# Patient Record
Sex: MALE | Race: WHITE | Employment: OTHER | URBAN - METROPOLITAN AREA
[De-identification: names, ages, dates, MRNs, and addresses within clinical notes are randomized per-mention and may not be internally consistent; named-entity substitution may affect disease eponyms.]

---

## 2020-02-03 ENCOUNTER — APPOINTMENT (OUTPATIENT)
Dept: CT IMAGING | Facility: CLINIC | Age: 70
End: 2020-02-03
Attending: EMERGENCY MEDICINE
Payer: COMMERCIAL

## 2020-02-03 ENCOUNTER — HOSPITAL ENCOUNTER (OUTPATIENT)
Facility: CLINIC | Age: 70
Setting detail: OBSERVATION
Discharge: HOME | End: 2020-02-04
Attending: EMERGENCY MEDICINE
Payer: COMMERCIAL

## 2020-02-03 DIAGNOSIS — I10 ESSENTIAL HYPERTENSION: Primary | ICD-10-CM

## 2020-02-03 DIAGNOSIS — G45.9 TIA (TRANSIENT ISCHEMIC ATTACK): ICD-10-CM

## 2020-02-03 LAB
ALBUMIN SERPL-MCNC: 3.2 G/DL (ref 3.4–5)
ALP SERPL-CCNC: 111 U/L (ref 40–150)
ALT SERPL W P-5'-P-CCNC: 19 U/L (ref 0–70)
ANION GAP SERPL CALCULATED.3IONS-SCNC: 3 MMOL/L (ref 3–14)
AST SERPL W P-5'-P-CCNC: 13 U/L (ref 0–45)
BASOPHILS # BLD AUTO: 0 10E9/L (ref 0–0.2)
BASOPHILS NFR BLD AUTO: 0.4 %
BILIRUB SERPL-MCNC: 0.5 MG/DL (ref 0.2–1.3)
BUN SERPL-MCNC: 15 MG/DL (ref 7–30)
CALCIUM SERPL-MCNC: 8.9 MG/DL (ref 8.5–10.1)
CHLORIDE SERPL-SCNC: 108 MMOL/L (ref 94–109)
CHOLEST SERPL-MCNC: 151 MG/DL
CO2 SERPL-SCNC: 31 MMOL/L (ref 20–32)
CREAT BLD-MCNC: 1 MG/DL (ref 0.66–1.25)
CREAT SERPL-MCNC: 0.95 MG/DL (ref 0.66–1.25)
DIFFERENTIAL METHOD BLD: NORMAL
EOSINOPHIL # BLD AUTO: 0.3 10E9/L (ref 0–0.7)
EOSINOPHIL NFR BLD AUTO: 3.7 %
ERYTHROCYTE [DISTWIDTH] IN BLOOD BY AUTOMATED COUNT: 13.9 % (ref 10–15)
ETHANOL SERPL-MCNC: <0.01 G/DL
GFR SERPL CREATININE-BSD FRML MDRD: 74 ML/MIN/{1.73_M2}
GFR SERPL CREATININE-BSD FRML MDRD: 82 ML/MIN/{1.73_M2}
GLUCOSE BLDC GLUCOMTR-MCNC: 105 MG/DL (ref 70–99)
GLUCOSE SERPL-MCNC: 126 MG/DL (ref 70–99)
HBA1C MFR BLD: 6.6 % (ref 0–5.6)
HCT VFR BLD AUTO: 43.1 % (ref 40–53)
HDLC SERPL-MCNC: 57 MG/DL
HGB BLD-MCNC: 13.9 G/DL (ref 13.3–17.7)
IMM GRANULOCYTES # BLD: 0 10E9/L (ref 0–0.4)
IMM GRANULOCYTES NFR BLD: 0 %
LDLC SERPL CALC-MCNC: 72 MG/DL
LYMPHOCYTES # BLD AUTO: 2.8 10E9/L (ref 0.8–5.3)
LYMPHOCYTES NFR BLD AUTO: 29.9 %
MAGNESIUM SERPL-MCNC: 1.5 MG/DL (ref 1.6–2.3)
MCH RBC QN AUTO: 29.9 PG (ref 26.5–33)
MCHC RBC AUTO-ENTMCNC: 32.3 G/DL (ref 31.5–36.5)
MCV RBC AUTO: 93 FL (ref 78–100)
MONOCYTES # BLD AUTO: 0.8 10E9/L (ref 0–1.3)
MONOCYTES NFR BLD AUTO: 8.8 %
NEUTROPHILS # BLD AUTO: 5.3 10E9/L (ref 1.6–8.3)
NEUTROPHILS NFR BLD AUTO: 57.2 %
NONHDLC SERPL-MCNC: 94 MG/DL
NRBC # BLD AUTO: 0 10*3/UL
NRBC BLD AUTO-RTO: 0 /100
NT-PROBNP SERPL-MCNC: 267 PG/ML (ref 0–900)
PLATELET # BLD AUTO: 282 10E9/L (ref 150–450)
POTASSIUM SERPL-SCNC: 3.5 MMOL/L (ref 3.4–5.3)
PROT SERPL-MCNC: 6.7 G/DL (ref 6.8–8.8)
RBC # BLD AUTO: 4.65 10E12/L (ref 4.4–5.9)
SODIUM SERPL-SCNC: 142 MMOL/L (ref 133–144)
TRIGL SERPL-MCNC: 109 MG/DL
TROPONIN I SERPL-MCNC: <0.015 UG/L (ref 0–0.04)
WBC # BLD AUTO: 9.2 10E9/L (ref 4–11)

## 2020-02-03 PROCEDURE — 80053 COMPREHEN METABOLIC PANEL: CPT | Performed by: EMERGENCY MEDICINE

## 2020-02-03 PROCEDURE — 96361 HYDRATE IV INFUSION ADD-ON: CPT

## 2020-02-03 PROCEDURE — 80061 LIPID PANEL: CPT | Performed by: EMERGENCY MEDICINE

## 2020-02-03 PROCEDURE — 84484 ASSAY OF TROPONIN QUANT: CPT | Performed by: EMERGENCY MEDICINE

## 2020-02-03 PROCEDURE — 25000128 H RX IP 250 OP 636: Performed by: EMERGENCY MEDICINE

## 2020-02-03 PROCEDURE — 93005 ELECTROCARDIOGRAM TRACING: CPT

## 2020-02-03 PROCEDURE — 96365 THER/PROPH/DIAG IV INF INIT: CPT | Mod: 59

## 2020-02-03 PROCEDURE — 83880 ASSAY OF NATRIURETIC PEPTIDE: CPT | Performed by: EMERGENCY MEDICINE

## 2020-02-03 PROCEDURE — 83036 HEMOGLOBIN GLYCOSYLATED A1C: CPT | Performed by: EMERGENCY MEDICINE

## 2020-02-03 PROCEDURE — 82565 ASSAY OF CREATININE: CPT

## 2020-02-03 PROCEDURE — 00000146 ZZHCL STATISTIC GLUCOSE BY METER IP

## 2020-02-03 PROCEDURE — 99220 ZZC INITIAL OBSERVATION CARE,LEVL III: CPT | Performed by: HOSPITALIST

## 2020-02-03 PROCEDURE — G0378 HOSPITAL OBSERVATION PER HR: HCPCS

## 2020-02-03 PROCEDURE — 99285 EMERGENCY DEPT VISIT HI MDM: CPT | Mod: 25

## 2020-02-03 PROCEDURE — 80320 DRUG SCREEN QUANTALCOHOLS: CPT | Performed by: EMERGENCY MEDICINE

## 2020-02-03 PROCEDURE — 70450 CT HEAD/BRAIN W/O DYE: CPT | Mod: 59

## 2020-02-03 PROCEDURE — 25000132 ZZH RX MED GY IP 250 OP 250 PS 637: Performed by: HOSPITALIST

## 2020-02-03 PROCEDURE — 83735 ASSAY OF MAGNESIUM: CPT | Performed by: EMERGENCY MEDICINE

## 2020-02-03 PROCEDURE — 70498 CT ANGIOGRAPHY NECK: CPT

## 2020-02-03 PROCEDURE — 25800030 ZZH RX IP 258 OP 636: Performed by: HOSPITALIST

## 2020-02-03 PROCEDURE — 25000125 ZZHC RX 250: Performed by: EMERGENCY MEDICINE

## 2020-02-03 PROCEDURE — 85025 COMPLETE CBC W/AUTO DIFF WBC: CPT | Performed by: EMERGENCY MEDICINE

## 2020-02-03 RX ORDER — HYDRALAZINE HYDROCHLORIDE 20 MG/ML
10-20 INJECTION INTRAMUSCULAR; INTRAVENOUS
Status: DISCONTINUED | OUTPATIENT
Start: 2020-02-03 | End: 2020-02-04 | Stop reason: HOSPADM

## 2020-02-03 RX ORDER — NALOXONE HYDROCHLORIDE 0.4 MG/ML
.1-.4 INJECTION, SOLUTION INTRAMUSCULAR; INTRAVENOUS; SUBCUTANEOUS
Status: DISCONTINUED | OUTPATIENT
Start: 2020-02-03 | End: 2020-02-04 | Stop reason: HOSPADM

## 2020-02-03 RX ORDER — POLYETHYLENE GLYCOL 3350 17 G/17G
17 POWDER, FOR SOLUTION ORAL DAILY PRN
Status: DISCONTINUED | OUTPATIENT
Start: 2020-02-03 | End: 2020-02-04 | Stop reason: HOSPADM

## 2020-02-03 RX ORDER — ACETAMINOPHEN 650 MG/1
650 SUPPOSITORY RECTAL EVERY 4 HOURS PRN
Status: DISCONTINUED | OUTPATIENT
Start: 2020-02-03 | End: 2020-02-04 | Stop reason: HOSPADM

## 2020-02-03 RX ORDER — ATORVASTATIN CALCIUM 40 MG/1
40 TABLET, FILM COATED ORAL
Status: DISCONTINUED | OUTPATIENT
Start: 2020-02-04 | End: 2020-02-04 | Stop reason: HOSPADM

## 2020-02-03 RX ORDER — NICOTINE POLACRILEX 4 MG
15-30 LOZENGE BUCCAL
Status: DISCONTINUED | OUTPATIENT
Start: 2020-02-03 | End: 2020-02-04 | Stop reason: HOSPADM

## 2020-02-03 RX ORDER — SODIUM CHLORIDE 9 MG/ML
INJECTION, SOLUTION INTRAVENOUS CONTINUOUS
Status: DISCONTINUED | OUTPATIENT
Start: 2020-02-03 | End: 2020-02-04 | Stop reason: HOSPADM

## 2020-02-03 RX ORDER — DEXTROSE MONOHYDRATE 25 G/50ML
25-50 INJECTION, SOLUTION INTRAVENOUS
Status: DISCONTINUED | OUTPATIENT
Start: 2020-02-03 | End: 2020-02-04 | Stop reason: HOSPADM

## 2020-02-03 RX ORDER — ASPIRIN 300 MG/1
300 SUPPOSITORY RECTAL DAILY
Status: DISCONTINUED | OUTPATIENT
Start: 2020-02-03 | End: 2020-02-04 | Stop reason: HOSPADM

## 2020-02-03 RX ORDER — ONDANSETRON 4 MG/1
4 TABLET, ORALLY DISINTEGRATING ORAL EVERY 6 HOURS PRN
Status: DISCONTINUED | OUTPATIENT
Start: 2020-02-03 | End: 2020-02-04 | Stop reason: HOSPADM

## 2020-02-03 RX ORDER — ACETAMINOPHEN 325 MG/1
650 TABLET ORAL EVERY 4 HOURS PRN
Status: DISCONTINUED | OUTPATIENT
Start: 2020-02-03 | End: 2020-02-04 | Stop reason: HOSPADM

## 2020-02-03 RX ORDER — MAGNESIUM SULFATE HEPTAHYDRATE 40 MG/ML
2 INJECTION, SOLUTION INTRAVENOUS ONCE
Status: COMPLETED | OUTPATIENT
Start: 2020-02-03 | End: 2020-02-03

## 2020-02-03 RX ORDER — LABETALOL HYDROCHLORIDE 5 MG/ML
10-40 INJECTION, SOLUTION INTRAVENOUS EVERY 10 MIN PRN
Status: DISCONTINUED | OUTPATIENT
Start: 2020-02-03 | End: 2020-02-04 | Stop reason: HOSPADM

## 2020-02-03 RX ORDER — IOPAMIDOL 755 MG/ML
70 INJECTION, SOLUTION INTRAVASCULAR ONCE
Status: COMPLETED | OUTPATIENT
Start: 2020-02-03 | End: 2020-02-03

## 2020-02-03 RX ORDER — ONDANSETRON 2 MG/ML
4 INJECTION INTRAMUSCULAR; INTRAVENOUS EVERY 6 HOURS PRN
Status: DISCONTINUED | OUTPATIENT
Start: 2020-02-03 | End: 2020-02-04 | Stop reason: HOSPADM

## 2020-02-03 RX ADMIN — IOPAMIDOL 70 ML: 755 INJECTION, SOLUTION INTRAVENOUS at 20:01

## 2020-02-03 RX ADMIN — ASPIRIN 325 MG: 325 TABLET, COATED ORAL at 22:59

## 2020-02-03 RX ADMIN — MAGNESIUM SULFATE HEPTAHYDRATE 2 G: 40 INJECTION, SOLUTION INTRAVENOUS at 21:24

## 2020-02-03 RX ADMIN — SODIUM CHLORIDE 100 ML: 9 INJECTION, SOLUTION INTRAVENOUS at 20:00

## 2020-02-03 RX ADMIN — SODIUM CHLORIDE: 9 INJECTION, SOLUTION INTRAVENOUS at 22:41

## 2020-02-03 ASSESSMENT — ENCOUNTER SYMPTOMS
PALPITATIONS: 1
SPEECH DIFFICULTY: 1
NUMBNESS: 1
TREMORS: 1

## 2020-02-03 ASSESSMENT — MIFFLIN-ST. JEOR
SCORE: 2214.15
SCORE: 2279

## 2020-02-04 ENCOUNTER — APPOINTMENT (OUTPATIENT)
Dept: MRI IMAGING | Facility: CLINIC | Age: 70
End: 2020-02-04
Attending: HOSPITALIST
Payer: COMMERCIAL

## 2020-02-04 ENCOUNTER — DOCUMENTATION ONLY (OUTPATIENT)
Dept: EDUCATION SERVICES | Facility: CLINIC | Age: 70
End: 2020-02-04

## 2020-02-04 ENCOUNTER — APPOINTMENT (OUTPATIENT)
Dept: CARDIOLOGY | Facility: CLINIC | Age: 70
End: 2020-02-04
Attending: HOSPITALIST
Payer: COMMERCIAL

## 2020-02-04 ENCOUNTER — APPOINTMENT (OUTPATIENT)
Dept: ULTRASOUND IMAGING | Facility: CLINIC | Age: 70
End: 2020-02-04
Attending: HOSPITALIST
Payer: COMMERCIAL

## 2020-02-04 VITALS
DIASTOLIC BLOOD PRESSURE: 66 MMHG | BODY MASS INDEX: 46.65 KG/M2 | SYSTOLIC BLOOD PRESSURE: 167 MMHG | WEIGHT: 315 LBS | RESPIRATION RATE: 16 BRPM | HEIGHT: 69 IN | TEMPERATURE: 95.8 F | OXYGEN SATURATION: 96 % | HEART RATE: 74 BPM

## 2020-02-04 LAB
ALBUMIN SERPL-MCNC: 3 G/DL (ref 3.4–5)
ALP SERPL-CCNC: 103 U/L (ref 40–150)
ALT SERPL W P-5'-P-CCNC: 20 U/L (ref 0–70)
ANION GAP SERPL CALCULATED.3IONS-SCNC: 2 MMOL/L (ref 3–14)
AST SERPL W P-5'-P-CCNC: 18 U/L (ref 0–45)
BILIRUB DIRECT SERPL-MCNC: 0.2 MG/DL (ref 0–0.2)
BILIRUB SERPL-MCNC: 0.7 MG/DL (ref 0.2–1.3)
BUN SERPL-MCNC: 12 MG/DL (ref 7–30)
CALCIUM SERPL-MCNC: 8.8 MG/DL (ref 8.5–10.1)
CHLORIDE SERPL-SCNC: 107 MMOL/L (ref 94–109)
CO2 SERPL-SCNC: 32 MMOL/L (ref 20–32)
CREAT SERPL-MCNC: 0.87 MG/DL (ref 0.66–1.25)
GFR SERPL CREATININE-BSD FRML MDRD: 88 ML/MIN/{1.73_M2}
GLUCOSE BLDC GLUCOMTR-MCNC: 120 MG/DL (ref 70–99)
GLUCOSE BLDC GLUCOMTR-MCNC: 125 MG/DL (ref 70–99)
GLUCOSE BLDC GLUCOMTR-MCNC: 137 MG/DL (ref 70–99)
GLUCOSE BLDC GLUCOMTR-MCNC: 99 MG/DL (ref 70–99)
GLUCOSE SERPL-MCNC: 132 MG/DL (ref 70–99)
POTASSIUM SERPL-SCNC: 3.7 MMOL/L (ref 3.4–5.3)
PROT SERPL-MCNC: 6.4 G/DL (ref 6.8–8.8)
SODIUM SERPL-SCNC: 141 MMOL/L (ref 133–144)
TROPONIN I SERPL-MCNC: <0.015 UG/L (ref 0–0.04)

## 2020-02-04 PROCEDURE — G0378 HOSPITAL OBSERVATION PER HR: HCPCS

## 2020-02-04 PROCEDURE — 80048 BASIC METABOLIC PNL TOTAL CA: CPT | Performed by: HOSPITALIST

## 2020-02-04 PROCEDURE — 40000893 ZZH STATISTIC PT IP EVAL DEFER: Performed by: PHYSICAL THERAPIST

## 2020-02-04 PROCEDURE — 36415 COLL VENOUS BLD VENIPUNCTURE: CPT | Performed by: HOSPITALIST

## 2020-02-04 PROCEDURE — 80076 HEPATIC FUNCTION PANEL: CPT | Performed by: HOSPITALIST

## 2020-02-04 PROCEDURE — 84484 ASSAY OF TROPONIN QUANT: CPT | Performed by: HOSPITALIST

## 2020-02-04 PROCEDURE — 96361 HYDRATE IV INFUSION ADD-ON: CPT

## 2020-02-04 PROCEDURE — 93306 TTE W/DOPPLER COMPLETE: CPT | Mod: 26 | Performed by: INTERNAL MEDICINE

## 2020-02-04 PROCEDURE — 70553 MRI BRAIN STEM W/O & W/DYE: CPT

## 2020-02-04 PROCEDURE — 99217 ZZC OBSERVATION CARE DISCHARGE: CPT | Performed by: PHYSICIAN ASSISTANT

## 2020-02-04 PROCEDURE — 25000132 ZZH RX MED GY IP 250 OP 250 PS 637: Performed by: HOSPITALIST

## 2020-02-04 PROCEDURE — 93970 EXTREMITY STUDY: CPT

## 2020-02-04 PROCEDURE — 25500064 ZZH RX 255 OP 636: Performed by: HOSPITALIST

## 2020-02-04 PROCEDURE — 00000146 ZZHCL STATISTIC GLUCOSE BY METER IP

## 2020-02-04 PROCEDURE — 25000132 ZZH RX MED GY IP 250 OP 250 PS 637: Performed by: PHYSICIAN ASSISTANT

## 2020-02-04 PROCEDURE — 99204 OFFICE O/P NEW MOD 45 MIN: CPT | Mod: GC | Performed by: PSYCHIATRY & NEUROLOGY

## 2020-02-04 PROCEDURE — 93306 TTE W/DOPPLER COMPLETE: CPT

## 2020-02-04 PROCEDURE — 40000895 ZZH STATISTIC SLP IP EVAL DEFER: Performed by: SPEECH-LANGUAGE PATHOLOGIST

## 2020-02-04 PROCEDURE — A9585 GADOBUTROL INJECTION: HCPCS | Performed by: HOSPITALIST

## 2020-02-04 RX ORDER — ASPIRIN 325 MG
325 TABLET, DELAYED RELEASE (ENTERIC COATED) ORAL DAILY
COMMUNITY
Start: 2020-02-05

## 2020-02-04 RX ORDER — LOSARTAN POTASSIUM 50 MG/1
50 TABLET ORAL DAILY
Status: DISCONTINUED | OUTPATIENT
Start: 2020-02-04 | End: 2020-02-04 | Stop reason: HOSPADM

## 2020-02-04 RX ORDER — HYDROCHLOROTHIAZIDE 12.5 MG/1
12.5 TABLET ORAL DAILY
Status: DISCONTINUED | OUTPATIENT
Start: 2020-02-04 | End: 2020-02-04 | Stop reason: HOSPADM

## 2020-02-04 RX ORDER — PROPRANOLOL HYDROCHLORIDE 160 MG/1
160 CAPSULE, EXTENDED RELEASE ORAL DAILY
Status: DISCONTINUED | OUTPATIENT
Start: 2020-02-04 | End: 2020-02-04 | Stop reason: HOSPADM

## 2020-02-04 RX ORDER — GABAPENTIN 300 MG/1
600 CAPSULE ORAL AT BEDTIME
COMMUNITY

## 2020-02-04 RX ORDER — HYDROCHLOROTHIAZIDE 25 MG/1
25 TABLET ORAL DAILY
Qty: 30 TABLET | Refills: 0 | Status: SHIPPED | OUTPATIENT
Start: 2020-02-04

## 2020-02-04 RX ORDER — ATORVASTATIN CALCIUM 40 MG/1
40 TABLET, FILM COATED ORAL DAILY
Status: DISCONTINUED | OUTPATIENT
Start: 2020-02-04 | End: 2020-02-04

## 2020-02-04 RX ORDER — HYDROCHLOROTHIAZIDE 12.5 MG/1
12.5 TABLET ORAL DAILY
Status: ON HOLD | COMMUNITY
End: 2020-02-04

## 2020-02-04 RX ORDER — LOSARTAN POTASSIUM 50 MG/1
50 TABLET ORAL DAILY
COMMUNITY

## 2020-02-04 RX ORDER — GADOBUTROL 604.72 MG/ML
15 INJECTION INTRAVENOUS ONCE
Status: COMPLETED | OUTPATIENT
Start: 2020-02-04 | End: 2020-02-04

## 2020-02-04 RX ORDER — PROPRANOLOL HYDROCHLORIDE 160 MG/1
160 CAPSULE, EXTENDED RELEASE ORAL DAILY
COMMUNITY

## 2020-02-04 RX ADMIN — PROPRANOLOL HYDROCHLORIDE 160 MG: 160 CAPSULE, EXTENDED RELEASE ORAL at 12:20

## 2020-02-04 RX ADMIN — ASPIRIN 325 MG: 325 TABLET, COATED ORAL at 11:01

## 2020-02-04 RX ADMIN — LOSARTAN POTASSIUM 50 MG: 50 TABLET, FILM COATED ORAL at 11:00

## 2020-02-04 RX ADMIN — GADOBUTROL 15 ML: 604.72 INJECTION INTRAVENOUS at 03:42

## 2020-02-04 RX ADMIN — Medication 1 MG: at 02:14

## 2020-02-04 RX ADMIN — ACETAMINOPHEN 650 MG: 325 TABLET, FILM COATED ORAL at 11:04

## 2020-02-04 RX ADMIN — HUMAN ALBUMIN MICROSPHERES AND PERFLUTREN 9 ML: 10; .22 INJECTION, SOLUTION INTRAVENOUS at 10:16

## 2020-02-04 RX ADMIN — HYDROCHLOROTHIAZIDE 12.5 MG: 12.5 TABLET ORAL at 11:01

## 2020-02-04 ASSESSMENT — MIFFLIN-ST. JEOR: SCORE: 2198.27

## 2020-02-04 NOTE — PLAN OF CARE
A&Ox4.  AVSS except /83.  Hydralazine and Labetatol PRN for SBP >220.   Telemetry on.  Left hand saline locked.  NS infusing at right AC.  Denies pain.  NIH score 0.  Neuros intact.  Passed bedside swallow test.  MRI checklist filled and faxed.

## 2020-02-04 NOTE — DISCHARGE SUMMARY
[facility]  Hospitalist Discharge Summary       Date of Admission:  2/3/2020  Date of Discharge:  2/4/2020  Discharging Provider: Maia Whittaker PA-C      Discharge Diagnoses   Right sided paresthesias possibly TIA vs Complex Migraine  Essential Hypertension, uncontrolled  Bilateral LE Edema  DM-2    Follow-ups Needed After Discharge   Follow-up Appointments     Follow-up and recommended labs and tests       Follow up with primary care provider, [facility],   within 7 days to evaluate medication change and for hospital follow- up.    The following labs/tests are recommended: BMP  Follow up with local neurologist in 4-6 weeks. We refer to most patient to   [facility], call [phone] to make an appointment. Or you   can discuss with your PCP alternative local options             Unresulted Labs Ordered in the Past 30 Days of this Admission     No orders found for last 31 day(s).        Discharge Disposition   Discharged to home  Condition at discharge: Stable    Hospital Course   HPI from H&P per Dr. Mendiola  Netta Thompson is a 69-year-old pleasant male with morbid obesity, BMI 44, diabetes mellitus type 2, hypertension, hyperlipidemia, who was brought to the ER by EMS for evaluation of right-sided numbness.  I discussed with the patient and his wife as well as daughter present in the room, reviewed his medical record, and also discussed with the ED physician, Dr. Oreilly, for further information.      According to the patient, he was at the desk using his computer, and after he had the computer mouse on his right hand he felt his palm went completely numb.  The numbness gradually progressed to his forearm, and then arm, and he also felt it over his lower lip and right leg.  He thought his speech was also slurred.  He then called the nursing line in the clinic, who suggested him to call 911, and hence he called EMS.  The patient reports the symptoms lasted  approximately 30 minutes.  He had very minimal symptoms when the EMS arrived.  He was then transported to ER for further evaluation.      The patient has essential tremor, and when he was put on telemetry, EMS felt he had atrial fibrillation, but I am not able to view a rhythm strip that shows atrial fibrillation.  He does have telemetry strip and that shows sinus with frequent PVCs.      The patient reports on-and-off mild headache behind the right orbit on and off for 2 weeks, for which he is taking Aleve.  He also has been feeling lightheaded, experienced it a couple of times during the last week when he was trying to stand up.  Wife reports that he has been somewhat slow in his thinking and not his usual self.  The patient denies any fever, cough, shortness of breath, chest pain, palpitation, orthopnea or PND.  His wife reported some leg edema, but he reports that he spends a lot of time sitting up in the chair or recliner.  Denies calf pain.      In the ER, the patient was evaluated by Dr. Oreilly.  The patient was hypertensive with systolic as high as 190s, heart rate and 75, afebrile, respiratory rate 16 and pulse oximetry of 94% on room air.  Lab workup including CBC, CMP, was mostly unremarkable except low magnesium.  A 12-lead EKG shows sinus rhythm with no ischemic changes.  CT head without contrast was negative for acute intracranial process.  CTA head and neck showed atherosclerotic plaque of the proximal and distal internal carotid arteries on both sides that do not result in any significant vascular narrowing.  Moderate to severe atherosclerotic narrowing at the origin of the right vertebral artery was noted.  The patient was requested observation for further evaluation.     Right sided paresthesias possibly TIA vs Complex Migraine: Presented with right sided paresthesias and facial numbness that resolved prior to arrival in the ER.   --CTA with calcified plaque in bilateral carotid arteries without  significant narrowing and mod-severe narrowing of right vertebral artery. MRI negative for acute infarct. Echo with normal EF, no WMA and negative bubble. FLP with LDL 72. A1C 6.6  -- No further symptoms. Greatly appreciate neurology consult. Possibly secondary to TIA vs complex migraine. Continue PTA Lipitor and Glipizide XL.  Rec addition of  mg/d and follow up with neurology as outpatient 4-6 weeks    Essential Hypertension, uncontrolled [ PTA regimen HCTZ 12.5 mg/d, Losartan 50 mg/d and Propranolol  mg/d]  - BP above goal. Will increase HCTZ to 25 mg/d and have patient follow up with PCP for BMP    Bilateral LE Edema: New per patient. US LE negative for DVT. Echo without evidence of CHF  - Trial increase in HCTZ. Keep elevated as able. Follow up with PCP    DM-2, A1C 6.6: Resume PTA Glipizide on disharge    Consultations This Hospital Stay   NEUROLOGY IP CONSULT  PHYSICAL THERAPY ADULT IP CONSULT  OCCUPATIONAL THERAPY ADULT IP CONSULT  SPEECH LANGUAGE PATH ADULT IP CONSULT  SWALLOW EVAL SPEECH PATH AT BEDSIDE IP CONSULT  SMOKING CESSATION PROGRAM IP CONSULT  PATIENT LEARNING CENTER IP CONSULT    Code Status   Full Code    Time Spent on this Encounter   I, Maia Whittaker PA-C, personally saw the patient today and spent greater than 30 minutes discharging this patient.       Maia Whittaker PA-C  [facility]  ______________________________________________________________________    Physical Exam   Vital Signs: Temp: 95.8  F (35.4  C) Temp src: Oral BP: (!) 167/66 Pulse: 74 Heart Rate: 75 Resp: 16 SpO2: 96 % O2 Device: None (Room air)    Weight: 318 lbs 1.6 oz  Constitutional: Alert, resting comfortably in NAD  HEENT: Head normocephalic, atraumatic. Eyes sclera non icteric. Oropharynx clear and most  Respiratory: Normal effort, symmetric expansion, no crackles or wheezing  Cardiovascular: RRR no murmurs   GI: Non distended, normal bowels sounds, no tenderness or guarding  MSK:  LE with 2 + edema. Dorsalis pedis pulse palpated bilaterally.   Skin/Integumen: Clear  Neuro: CN II-XII grossly intact  Psych:  Alert and oriented x 3. Normal affect         Primary Care Physician   [facility]    Discharge Orders      Reason for your hospital stay    You were admitted for evaluation of neurologic symptoms. A stroke was ruled out with MRI. You were evaluated by Neurology and your symptoms may have been secondary to TIA vs complex migraine. You should take a full dose Aspirin daily and follow up with neurology as outpatient.    Your HCTZ blood pressure medication was increased. This may help with your LE edema. Follow up with your primary care provider in the next 10 days for recheck.     Follow-up and recommended labs and tests     Follow up with primary care provider, [facility], within 7 days to evaluate medication change and for hospital follow- up.  The following labs/tests are recommended: BMP  Follow up with local neurologist in 4-6 weeks. We refer to most patient to [facility], call [phone] to make an appointment. Or you can discuss with your PCP alternative local options     Activity    Your activity upon discharge: activity as tolerated     Diet    Follow this diet upon discharge: Orders Placed This Encounter      Moderate Consistent CHO Diet       Significant Results and Procedures   Most Recent 3 CBC's:  Recent Labs   Lab Test 02/03/20 1913 04/01/13  0809 03/31/13  0700 03/29/13  0710   WBC 9.2  --  10.1 9.3   HGB 13.9  --  13.6 12.9*   MCV 93  --  88 91    284 261 226     Most Recent 3 BMP's:  Recent Labs   Lab Test 02/04/20  0607 02/03/20 1913 03/31/13  0700    142 137   POTASSIUM 3.7 3.5 3.5   CHLORIDE 107 108 99   CO2 32 31 22   BUN 12 15 13   CR 0.87 0.95 0.85   ANIONGAP 2* 3 15   SRAVAN 8.8 8.9 8.7   * 126* 218*     Most Recent 3 Troponin's:  Recent Labs   Lab Test 02/04/20  0607 02/03/20 1913   TROPI  <0.015 <0.015     Most Recent D-dimer:No lab results found.  Most Recent Cholesterol Panel:  Recent Labs   Lab Test 02/03/20 1913   CHOL 151   LDL 72   HDL 57   TRIG 109     Most Recent Hemoglobin A1c:  Recent Labs   Lab Test 02/03/20 1913   A1C 6.6*   ,   Results for orders placed or performed during the hospital encounter of 02/03/20   Head CT w/o contrast    Narrative    CT OF THE HEAD WITHOUT CONTRAST 2/3/2020 8:08 PM     COMPARISON: None.    HISTORY: Transient ischemic attack. Right-sided symptoms.    TECHNIQUE: 5 mm thick axial CT images of the head were acquired  without IV contrast material.    FINDINGS: There is mild diffuse cerebral volume loss. There are subtle  patchy areas of decreased density in the cerebral white matter  bilaterally that are consistent with sequela of chronic small vessel  ischemic disease.    The ventricles and basal cisterns are within normal limits in  configuration given the degree of cerebral volume loss.  There is no  midline shift. There are no extra-axial fluid collections.    No intracranial hemorrhage, mass or recent infarct.    The visualized paranasal sinuses are well-aerated. There is no  mastoiditis. There are no fractures of the visualized bones.      Impression    IMPRESSION: Diffuse cerebral volume loss and cerebral white matter  changes consistent with chronic small vessel ischemic disease. No  evidence for acute intracranial pathology.        Radiation dose for this scan was reduced using automated exposure  control, adjustment of the mA and/or kV according to patient size, or  iterative reconstruction technique    SHAN RODNEY MD   CTA Head Neck with Contrast    Narrative    CT ANGIOGRAM OF THE HEAD AND NECK WITHOUT AND WITH CONTRAST  2/3/2020  8:09 PM     COMPARISON: None.    HISTORY: TIA, initial exam.    TECHNIQUE:  Precontrast localizing scans were followed by CT  angiography with an injection of 70 mL [id number]-370 nonionic intravenous  contrast material with  scans through the head and neck.  Images were  transferred to a separate 3-D workstation where multiplanar  reformations and 3-D images were created.  Estimates of carotid  stenoses are made relative to the distal internal carotid artery  diameters except as noted.      FINDINGS:   Neck CTA: The common carotid arteries bilaterally are patent and  unremarkable. There is calcified atherosclerotic plaque at the origins  of the internal carotid arteries on both sides that does not result in  any vascular narrowing on either side. The cervical internal carotid  arteries bilaterally are tortuous but are otherwise patent and  unremarkable. There is moderate-severe atherosclerotic narrowing at  the origin of the right vertebral artery. The vertebral arteries  bilaterally are otherwise patent and unremarkable.    Head CTA: There is calcified atherosclerotic plaque of the  intracranial distal internal carotid arteries on both sides that does  not result in any significant vascular narrowing on either side. The  basilar, bilateral anterior cerebral, bilateral middle cerebral and  bilateral posterior cerebral arteries are patent and unremarkable. The  A1 segment of the right anterior cerebral artery is not visualized and  is likely hypoplastic or congenitally absent. The A2 segment of right  anterior cerebral artery is supplied by the patent anterior  communicating artery. The P1 segment of the right posterior cerebral  artery is not visualized and is likely hypoplastic or congenitally  absent. The right posterior cerebral artery is supplied by the patent  right posterior communicating artery.      Impression    IMPRESSION: Calcified atherosclerotic plaque of the proximal and  distal internal carotid arteries on both sides that does not result in  any vascular narrowing. Moderate-severe atherosclerotic narrowing at  the origin of the right vertebral artery. Otherwise, normal neck and  head CT angiogram.    Radiation dose for  this scan was reduced using automated exposure  control, adjustment of the mA and/or kV according to patient size, or  iterative reconstruction technique.      SHAN RODNEY MD   MRI Brain w & w/o contrast    Narrative    [facility]  MR BRAIN W/O and W CONTRAST  2/4/2020 3:20 AM    INDICATION: Right arm numbness radiating to right leg. Difficulty speaking.  TECHNIQUE: Multiplanar T1- and T2-weighted images were obtained through the brain pre and post administration of contrast.  CONTRAST: 15 mL Gadavist IV.  COMPARISON: CTA [facility] 2/3/2020.     FINDINGS:   INTRACRANIAL CONTENTS: There are no areas of abnormally restricted diffusion to suggest acute or subacute infarct. There are no areas of abnormal parenchymal susceptibility. There are no areas of abnormal enhancement.    Mild to moderate prominence of the lateral and third ventricles. Mild prominence of the sulci. FLAIR hyperintensity seen within the periventricular white matter with scattered additional foci seen within the deep white matter of both cerebral   hemispheres. No mass effect or midline shift. Cerebellar tonsils are normally positioned. Visualized upper cervical spinal cord, sella, and corpus callosum are unremarkable. Major skull base vascular flow-voids are preserved.    OSSEOUS STRUCTURES/SOFT TISSUES: Visualized marrow space is unremarkable.     ORBITS: Orbits are normal in appearance.     SINUSES/MASTOIDS: Mild paranasal sinus mucosal thickening. No air-fluid levels. Middle ear cavities and mastoid air cells are clear.       Impression    IMPRESSION:  1. No finding for intracranial hemorrhage, mass, or acute infarct.  2. At least mild atrophy and presumed sequela chronic microvascular ischemic change.           US Lower Extremity Venous Duplex Bilateral    Narrative    VENOUS ULTRASOUND BOTH LEGS  2/4/2020 10:53 AM     HISTORY: Bilateral leg edema. Pain and swelling in the legs.    COMPARISON: None.    FINDINGS:   Examination of the deep veins with graded compression and  color flow Doppler with spectral wave form analysis was performed.      Impression    IMPRESSION: No evidence of deep venous thrombosis.    ALISHA CHUN MD   Echocardiogram Complete - Bubble study    Narrative    [id number]  [id number]  [id number]  [id number]^TAMMIE^R           [facility]  Echocardiography Laboratory  [address] [place] [address]        Name: KRIS ALVES  MRN: [id number]  : 1950  Study Date: 2020 09:21 AM  Age: 69 yrs  Gender: Male  [facility]  Reason For Study: TIA  Ordering Physician: TAMMIE BUSH  Referring Physician: [facility]  Performed By: Micha Jain RDCS     BSA: 2.5 m2  Height: 69 in  Weight: 322 lb  HR: 74  BP: 172/82 mmHg  _____________________________________________________________________________  __        Procedure  Complete Portable Bubble Echo Adult. Optison (NDC #2099-2492) given  intravenously.  _____________________________________________________________________________  __        Interpretation Summary     Left ventricular size, global systolic function, and wall motion are normal,  estimated LVEF 55-60%.  Right ventricular global function is normal.  No significant valvular abnormalities.  Intact atrial septum. A contrast injection (Bubble Study) was performed that  was negative for flow across the interatrial septum. A Valsalva maneuver was  performed.  The ascending aorta is Mildly dilated. Max diameter of the visualized portion  3.9 cm.     There are no prior studies available for comparison.  _____________________________________________________________________________  __        Left Ventricle  The left ventricle is normal in size. There is normal left ventricular wall  thickness. Left ventricular systolic function is normal. The visual ejection  fraction is estimated at 55-60%. Diastolic Doppler findings (E/E' ratio  and/or  other parameters) suggest left ventricular filling pressures are increased.  Normal left ventricular wall motion.     Right Ventricle  The right ventricle is normal in structure, function and size.     Atria  The left atrium is mildly dilated. Right atrial size is normal. Intact atrial  septum. A contrast injection (Bubble Study) was performed that was negative  for flow across the interatrial septum. A Valsalva maneuver was performed.     Mitral Valve  The mitral valve leaflets appear thickened, but open well. There is trace  mitral regurgitation.        Tricuspid Valve  The tricuspid valve is not well visualized, but is grossly normal. There is  trace tricuspid regurgitation. The right ventricular systolic pressure is  approximated at 34.2 mmHg plus the right atrial pressure.     Aortic Valve  The aortic valve is trileaflet. There is mild trileaflet aortic sclerosis.  There is trace aortic regurgitation.     Pulmonic Valve  The pulmonic valve is not well seen, but is grossly normal.     Vessels  The aortic root is normal size. The ascending aorta is Mildly dilated. Max  diameter of the visualized portion 3.9 cm. The inferior vena cava was normal  in size with preserved respiratory variability.     Pericardium  There is no pericardial effusion.     _____________________________________________________________________________  __  MMode/2D Measurements & Calculations  IVSd: 0.97 cm  LVIDd: 5.3 cm  LVIDs: 3.7 cm  LVPWd: 1.2 cm  FS: 29.5 %  LV mass(C)d: 220.3 grams  LV mass(C)dI: 87.1 grams/m2     Ao root diam: 3.6 cm  LA dimension: 3.8 cm  asc Aorta Diam: 3.9 cm  LA/Ao: 1.0  LA Volume (BP): 90.5 ml  LA Volume Index (BP): 35.8 ml/m2  RWT: 0.44        Doppler Measurements & Calculations  MV E max michelle: 95.6 cm/sec  MV A max michelle: 130.5 cm/sec  MV E/A: 0.73  MV dec time: 0.20 sec     Ao V2 max: 167.8 cm/sec  Ao max P.0 mmHg  Ao V2 mean: 123.8 cm/sec  Ao mean P.0 mmHg  Ao V2 VTI: 40.5 cm  AI P1/2t: 571.1  msec  LV V1 max P.8 mmHg  LV V1 max: 130.5 cm/sec  LV V1 VTI: 29.4 cm  PA acc time: 0.12 sec  TR max kayden: 292.4 cm/sec  TR max P.2 mmHg  AV Kayden Ratio (DI): 0.78  E/E' av.9  Lateral E/e': 18.5  Medial E/e': 21.2           _____________________________________________________________________________  __           Report approved by: Henry Villasenor 2020 11:34 AM            Discharge Medications   Current Discharge Medication List      START taking these medications    Details   aspirin (ASA) 325 MG EC tablet Take 1 tablet (325 mg) by mouth daily    Associated Diagnoses: TIA (transient ischemic attack)         CONTINUE these medications which have CHANGED    Details   hydrochlorothiazide (HYDRODIURIL) 25 MG tablet Take 1 tablet (25 mg) by mouth daily  Qty: 30 tablet, Refills: 0    Comments: Future refills by PCP Dr. Guevara [facility] with phone number [phone].  Associated Diagnoses: Essential hypertension         CONTINUE these medications which have NOT CHANGED    Details   Atorvastatin Calcium (LIPITOR PO) Take 40 mg by mouth daily.      Febuxostat (ULORIC PO) Take 40 mg by mouth daily.      gabapentin (NEURONTIN) 300 MG capsule Take 600 mg by mouth At Bedtime      GLIPIZIDE XL PO Take 20 mg by mouth daily       losartan (COZAAR) 50 MG tablet Take 50 mg by mouth daily      propranolol ER (INDERAL LA) 160 MG 24 hr capsule Take 160 mg by mouth daily           Allergies   Allergies   Allergen Reactions     Metformin Diarrhea     Cortizone Rash     cream

## 2020-02-04 NOTE — PLAN OF CARE
Observation goals PRIOR TO DISCHARGE    Comments:   -diagnostic tests and consults completed and resulted : Not met    -vital signs normal or at patient baseline : Not met. Elevated BP    -tolerating oral intake to maintain hydration : Not met. NPO    -returns to baseline functional status : Partially met    -safe disposition plan has been identified: Yes

## 2020-02-04 NOTE — ED TRIAGE NOTES
Pt. Brought in from home via EMS. Had symptoms of right sided arm numbness and went down right leg and lip went numb. Pt. Did notice some aphasia.

## 2020-02-04 NOTE — ED PROVIDER NOTES
History     Chief Complaint:  One-sided Weakness    HPI   Netta Thompson is a 69 year old male with history of diabetes, HTN, and Hypercholesteremia who presents with one-sided numbness. EMS reports the patient was home alone when he noticed right-arm numbness that radiated to his right leg along with difficulty speaking. EMS states the patient's symptoms resolved 20 minutes to arrival. EMS says in the ambulance the patient's stroke assessment was negative but they noticed irregular rhythm on the ECG (patient had no prior heart conditions). During evaluation, the patient states he had right-sided numbness and currently has tremors and palpitations. He notes drinking yesterday and not being a frequent drinker. He denies chest pain and currently no difficulty speaking.    Allergies:  Metformin  Cortizone     Medications:    Asa 81 mg  Atenolol  Lipitor  Uloric  Glipizide  Lantus  Zofran  Gabapentin  Oretic  Cozaar  Propranolol    Past Medical History:    Type 2 Diabetes  Hypercholesteremia  HTN  SBO  Morbid obesity  Intention tremor    Past Surgical History:    Umbilical hernia    Family History:    CAD  Type 2 diabetes  HTN    Social History:  Smoking status: Former  Alcohol use: Occasional  Drug use: No  The patient presents to the emergency department with wife.  Marital Status:   [2]     Review of Systems   Cardiovascular: Positive for palpitations. Negative for chest pain.   Neurological: Positive for tremors, speech difficulty and numbness.   All other systems reviewed and are negative.      Physical Exam     Patient Vitals for the past 24 hrs:   BP Temp Temp src Pulse Heart Rate Resp SpO2 Height Weight   02/03/20 2156 -- -- -- -- 70 10 94 % -- --   02/03/20 2056 (!) 160/68 -- -- -- 74 13 91 % -- --   02/03/20 2034 (!) 136/104 -- -- 71 71 16 95 % -- --   02/03/20 2027 -- -- -- -- 75 18 95 % -- --   02/03/20 1949 123/75 -- -- 74 74 17 93 % -- --   02/03/20 1913 (!) 176/76 -- -- 74 77 16 94 % -- --    02/03/20 1910 (!) 181/91 98.6  F (37  C) Temporal 75 -- 20 95 % 1.829 m (6') 147.6 kg (325 lb 6.4 oz)     Physical Exam  Constitutional: Alert, attentive, GCS 15  HENT:    Nose: Nose normal.    Mouth/Throat: Oropharynx is clear, mucous membranes are moist  Eyes: EOM are normal, anicteric, conjugate gaze  CV: regular rate and rhythm; no murmurs  Chest: Effort normal and breath sounds clear without wheezing or rales, symmetric bilaterally   GI:  non tender. No distension. No guarding or rebound.    MSK: No LE edema, no tenderness to palpation of BLE.  Neurological:   GCS 15; A/Ox3; Cranial nerves 2-12 intact;   5/5 strength throughout the upper and lower extremities;   sensation intact to light touch throughout the upper and lower extremities;   2+ DTRs to the bilateral upper and lower extremities (biceps, BRs, patellar, achilles);   normal fine motor coordination intact bilaterally;   normal gait   No meningismus  Mildly tremulous.  Skin: Skin is warm and dry.    [employer] Stroke Scale  Exam Interval: Baseline   Score    Level of consciousness: (0)   Alert, keenly responsive    LOC questions: (0)   Answers both questions correctly    LOC commands: (0)   Performs both tasks correctly    Best gaze: (0)   Normal    Visual: (0)   No visual loss    Facial palsy: (0)   Normal symmetrical movements    Motor arm (left): (0)   No drift    Motor arm (right): (0)   No drift    Motor leg (left): (0)   No drift    Motor leg (right): (0)   No drift    Limb ataxia: (0)   Absent    Sensory: (0)   Normal- no sensory loss    Best language: (0)   Normal- no aphasia    Dysarthria: (0)   Normal    Extinction and inattention: (0)   No abnormality        Total Score:  0     Emergency Department Course   ECG (19:05:28):  Rate 78 bpm. NC interval 184. QRS duration 92. QT/QTc 392/446. P-R-T axes 53 22 33. Normal sinus rhythm. Normal ECG. Interpreted at 1904 by Abhinav Oreilly MD.    Imaging:  Radiographic findings  were communicated with the patient who voiced understanding of the findings.    CTA Head Neck with Contrast  IMPRESSION: Calcified atherosclerotic plaque of the proximal and  distal internal carotid arteries on both sides that does not result in  any vascular narrowing. Moderate-severe atherosclerotic narrowing at  the origin of the right vertebral artery. Otherwise, normal neck and  head CT angiogram.     Radiation dose for this scan was reduced using automated exposure  control, adjustment of the mA and/or kV according to patient size, or  iterative reconstruction technique.       SHAN RODNEY MD    Head CT w/o contrast  IMPRESSION: Diffuse cerebral volume loss and cerebral white matter  changes consistent with chronic small vessel ischemic disease. No  evidence for acute intracranial pathology.    Radiation dose for this scan was reduced using automated exposure  control, adjustment of the mA and/or kV according to patient size, or  iterative reconstruction technique     SHAN RODNEY MD    Laboratory:  CBC: WNL (WBC 9.2, HGB 13.9, )  CMP: Glucose 126 (H), Albumin 3.2 (L), Protein 6.7 (L), o/w WNL (Creatinine 0.95)  Magnesium 1.5 (L)  Ethanol <0.01  Lipid panel: WNL  Nt probnp: 267  Creatinine POCT: WNL  1913 Troponin I <0.015  Hemoglobin A1c 6.6 (H)    Interventions:  2124 Magnesium sulfate 2 g IV    Emergency Department Course:  The patient arrived in the emergency department via EMS.    Past medical records, nursing notes, and vitals reviewed.  1901: I performed an exam of the patient and obtained history, as documented above.    IV inserted and blood drawn.    The patient was sent for a head and neck CT while in the emergency department, findings above.    Findings and plan explained to the Patient who consents to admission.     2143: Discussed the patient with Dr. Mendiola, who will admit the patient to an observation bed for further monitoring, evaluation, and treatment.     Impression & Plan     Medical Decision Makin-year-old male past medical history significant for hypertension, hyperlipidemia presenting for evaluation of initially reported irregular heart rhythm also endorsing TIA symptoms that resolved prior to arrival where he noted 30 minutes of right arm tingling and facial tingling without weakness or slurred speech or dysphagia.  No one was present at the time of his symptoms.  He has an NIH of 0 on arrival here no indication for code stroke, is not a TPA candidate and exam not consistent with large vessel occlusion.  CT a head and neck does show atherosclerotic of the right vertebral artery suggestive against the etiology of his symptoms.  Noncon CT of the head was negative.  Lab work-up unremarkable.  Given his age and risk factors, will admit to medicine on observation status for likely TIA work-up.  EKG here was normal sinus rhythm, do not suspect A. fib based on history he does have a baseline tremor and I suspect that EMS rate was thrown off by artifact.    Diagnosis:    ICD-10-CM    1. TIA (transient ischemic attack) G45.9        Disposition:  Admitted to observation.    Abhinav Oreilly MD  [employer]  1:28 AM 20       Scribe Disclosure:  I, Savanah Bates, am serving as a scribe at 7:01 PM on 2/3/2020 to document services personally performed by No att. providers found based on my observations and the provider's statements to me.    [facility] EMERGENCY DEPARTMENT       Abhinav Oreilly MD  20 [phone]

## 2020-02-04 NOTE — PLAN OF CARE
Discharge Planner PT   Patient plan for discharge: None state per chart.   Current status: Orders received and chart reviewed. Patient is a 68 y/o male admitted with R UE and LE numbness and slurred speech. Pt lives with spouse. Per discussion during care rounds, pt is up SBA with no balance impairments noted.   Received smoking cessation: Per chart pt is a former smoker and quit more than 40 years ago, will complete order.  Barriers to return to prior living situation: None indicated per chart and discussion with care team.   Recommendations for discharge: Return home with spouse.  Rationale for recommendations: No IP PT needs indicated at this time. Per discussion with RN, symptoms have resolved and pt is at baseline.        Entered by: Beverly Ramirez 02/04/2020 9:22 AM

## 2020-02-04 NOTE — PLAN OF CARE
PT is A&Ox4. VSS with hypertension. C/o aching pain behind and below is right eye. Neuros are intact. MRI completed. Up with SBA. SR with PVCs. Plan for echo and cardiology consult, along with speech and OT eval.

## 2020-02-04 NOTE — PLAN OF CARE
Pt discharging at this time accompanied by pt's daughter. AVS and education given. Questions answered. Pt discharging home with all her belongings.

## 2020-02-04 NOTE — ED NOTES
[facility]  ED Nurse Handoff Report    ED Chief complaint: One-sided Weakness      ED Diagnosis:   Final diagnoses:   None       Code Status: hospitalist to address     Allergies:   Allergies   Allergen Reactions     Metformin Diarrhea     Cortizone Rash     cream       Patient Story: pta pt had onset of right arm, right leg and right sided facial numbness. Symptoms resolved by the time he arrived to ED.  Focused Assessment:  Sudden onset of right sided numbness, no symptoms currently- has since resolved. Pt hypertensive and some what tremulous. A&0x4.    Treatments and/or interventions provided: see MAR  Patient's response to treatments and/or interventions:     To be done/followed up on inpatient unit:  none at this time     Does this patient have any cognitive concerns?: N/A    Activity level - Baseline/Home:  Independent  Activity Level - Current:   Independent    Patient's Preferred language: English   Needed?: No    Isolation: None  Infection: Not Applicable  Bariatric?: Yes    Vital Signs:   Vitals:    02/03/20 1913 02/03/20 1949 02/03/20 2027 02/03/20 2034   BP: (!) 176/76 123/75  (!) 136/104   Pulse: 74 74  71   Resp: 16 17 18 16   Temp:       TempSrc:       SpO2: 94% 93% 95% 95%   Weight:       Height:           Cardiac Rhythm:Cardiac Rhythm: Normal sinus rhythm    Was the PSS-3 completed:   Yes  What interventions are required if any?               Family Comments: family at bedside   OBS brochure/video discussed/provided to patient/family: Yes              Name of person given brochure if not patient:               Relationship to patient:     For the majority of the shift this patient's behavior was Green.   Behavioral interventions performed were .    ED NURSE PHONE NUMBER: [phone]

## 2020-02-04 NOTE — PROGRESS NOTES
.RECEIVING UNIT ED HANDOFF REVIEW    ED Nurse Handoff Report was reviewed by: Shyann Bassett RN on February 3, 2020 at 9:46 PM

## 2020-02-04 NOTE — CONSULTS
"  [facility]    Stroke Consult Note    Reason for Consult:  Possible TIA    Chief Complaint: Right upper and lower extremity numbness and slurred speech    HPI  Netta Thompson is a 69 year old male w/ history of morbid obesity, T2DM, HTN, HLD, chronic gout, who presents for evaluation of right sided numbness. Patient reports that yesterday (02/03/20), he was sitting at his computer around 1930 when he noticed sudden right palmar numbness. The numbness gradually traveled up the right arm into the bicep. This took a couple of minutes. He then noticed right lower extremity numbness that started distally and radiated upwards into the calf, and bilateral lower lip numbness. During this time, he also felt that he was unable to pronounce his words properly. He called the nursing line who recommended he call EMS. The patient notes that his symptoms had mostly resolved prior to arrival to the ED. The numbness in his hand and arm resolved first, followed by resolution in his lip and lower extremity. The entire episode lasted about 45 minutes. Netta denies any weakness in his extremities, vision or hearing changes. He has not had similar symptoms in the past.    Netta does note that earlier throughout the day, he had been feeling \"off\". He states that his daughter mentioned that she felt he had been \"somewhat slow\", especially with answering questions. Netta also states that he has been having an intermittent headache for the past couple of weeks. The headache is located behind the right eye and is a constant aching sensation when present. He denies any chest pain, SOB, cough, fever, congestion, nausea, or vomiting.      TIA Evaluation Summarized  MRI and/or Head CT: Negative for intracranial hemorrhage, mass, or acute infarct  Intracranial Vascular Imaging: Head and Neck CTA unremarkable  Cervical Carotid and Vertebral Artery Vascular Imaging: No significant vascular narrowing of bilateral carotid arteries. " Moderate-severe atherosclerotic narrowing at origin of R vertebral artery. Vertebral arteries are otherwise patent and unremarkable bilaterally.   Echocardiogram: Unremarkable. EF 55-60%, no valvular abnormality, negative bubble study.   EKG/Telemetry: Sinus rhythm w/ no ischemic changes  LDL: 73  A1c: 6.6  Troponin: <0.015    ABCD2 Patients Score   Age ? 60 years 1 point 1   Blood Pressure     -SBP ? 140 or DBP ?  90    1 point 1   Clinical Features    -Unilateral weakness   -Speech disturbance w/o weakness    -Other    2 points  1 point    0 points 1   Duration of symptoms    ?60 minutes    10-59 minutes    <10 minutes   2 points  1 point  0 points 1   Diabetes  1 point 1   Patient s ABCD2 Score (0-7) = 5       Impression  This is a 69 year old male with gradual onset right hand and arm, right lower extremity, and lower lip paresthesias, along with perceived speech difficulty for a duration of 45 minutes. CT, CTA, MRI were unremarkable for stroke or acute intracranial pathology. His symptoms most likely represent migraine with aura, given the gradual nature of his numbness, order of resolution of his symptoms, and associated headache. However, he does have risk factors for a TIA, although his A1c and LDL show good control. It is also unusual for migraines to present at his age given his lack of history. Thus, while less likely than a migraine aura, TIA can not be completely ruled out. His symptoms do not represent a focal seizure.    Recommendations  - Start 325 mg aspirin daily until follow up with neurology.   - Long term BP goals <130/85.  - Continue atorvastatin 40 mg daily.  - Follow up with neurology in outpatient setting.  - Stroke education provided.    Patient Follow-up    - Follow up in 4-6 weeks with a neurologist.    Thank you for this consult. No further stroke evaluation is recommended, so we will sign off. Please contact us with any additional questions.    Javier Ferro, MS3   [facility]  "[place]    Patient discussed with Dr. Galindo and Dr. Peterson.    Resident/Fellow Attestation   I, Fabio Peterson, was present with the medical student who participated in the service and in the documentation of the note.  I have verified the history and personally performed the physical exam and medical decision making.  I agree with the assessment and plan of care as documented in the note.      MD Fabio Oliva MD  Fellow, Vascular Neurology  Text Page    To page stroke neurology after hours through [facility], click here: [facility]  Choose \"On Call\" tab at top, then search dropdown box for \"Neurology Adult\"      _____________________________________________________    Past Medical History   Past Medical History:   Diagnosis Date     Diabetes mellitus (H)      High cholesterol      Hypertension      Past Surgical History   Past Surgical History:   Procedure Laterality Date     LAPAROSCOPIC LYSIS ADHESIONS  3/28/2013    Procedure: LAPAROSCOPIC LYSIS ADHESIONS;  LAPAROSCOPIC LYSIS OF ADHESIONS;  Surgeon: Shine Gómez MD;  Location:  OR     Medications   Home Meds  Prior to Admission medications    Medication Sig Start Date End Date Taking? Authorizing Provider   Atorvastatin Calcium (LIPITOR PO) Take 40 mg by mouth daily.   Yes Reported, Patient   Febuxostat (ULORIC PO) Take 40 mg by mouth daily.   Yes Reported, Patient   gabapentin (NEURONTIN) 300 MG capsule Take 600 mg by mouth At Bedtime   Yes Unknown, Entered By History   GLIPIZIDE XL PO Take 20 mg by mouth daily    Yes Unknown, Entered By History   hydrochlorothiazide (HYDRODIURIL) 12.5 MG tablet Take 12.5 mg by mouth daily   Yes Unknown, Entered By History   losartan (COZAAR) 50 MG tablet Take 50 mg by mouth daily   Yes Unknown, Entered By History   propranolol ER (INDERAL LA) 160 MG 24 hr capsule Take 160 mg by mouth daily   Yes Unknown, Entered By History       Scheduled Meds    aspirin  325 mg Oral Daily    Or "     aspirin  300 mg Rectal Daily     atorvastatin  40 mg Oral or NG Tube Daily at 8 pm     hydrochlorothiazide  12.5 mg Oral Daily     insulin aspart  1-7 Units Subcutaneous TID AC     insulin aspart  1-5 Units Subcutaneous At Bedtime     losartan  50 mg Oral Daily     propranolol ER  160 mg Oral Daily       Infusion Meds    - MEDICATION INSTRUCTIONS -       sodium chloride 75 mL/hr at 02/03/20 2241       PRN Meds  acetaminophen, acetaminophen, glucose **OR** dextrose **OR** glucagon, hydrALAZINE, labetalol, - MEDICATION INSTRUCTIONS -, melatonin, naloxone, ondansetron **OR** ondansetron, polyethylene glycol    Allergies   Allergies   Allergen Reactions     Metformin Diarrhea     Cortizone Rash     cream     Family History   1. Diabetes, hypertension and coronary artery disease in father.  2. Mother had unknown cancer.     Social History   Social History     Tobacco Use     Smoking status: Former Smoker     Smokeless tobacco: Never Used   Substance Use Topics     Alcohol use: Yes     Comment: states on occasion he is a heavy drinker      Drug use: No     He used to work for [employer] and is retired. Lives with his wife.    Review of Systems      Skin: negative for rash, itching  Eyes: negative for visual blurring, double vision, photophobia  Ears/Nose/Throat: negative for nasal congestion, purulent rhinorrhea, hearing loss  Respiratory: No shortness of breath, dyspnea on exertion, cough, or hemoptysis  Cardiovascular: Positive for edema. negative for chest pain and dyspnea on exertion  Gastrointestinal: negative for abdominal pain, vomiting, nausea  Musculoskeletal: negative for muscle aches, joint pain  Neurologic: positive for headaches, numbness or tingling of hands, numbness or tingling of feet, speech problems and tremor  Psychiatric: negative  Hematologic/Lymphatic/Immunologic: negative  Endocrine: positive for diabetes.       PHYSICAL EXAMINATION   Temp:  [96  F (35.6  C)-98.6  F (37  C)] 96  F (35.6   C)  Pulse:  [71-75] 72  Heart Rate:  [70-77] 75  Resp:  [10-20] 16  BP: (123-194)/() 172/82  SpO2:  [91 %-95 %] 92 %    General:  patient lying in bed without any acute distress    HEENT:  normocephalic/atraumatic  Cardio:  RRR  Pulmonary:  no respiratory distress  Abdomen:  soft, non-tender, obese  Extremities:  pitting edema present  Skin:  intact, warm/dry     Neurologic  Mental Status:  alert, oriented x 3, follows commands, speech clear and fluent, naming and repetition normal  Cranial Nerves:  visual fields intact, PERRL, EOMI with normal smooth pursuit, facial sensation intact and symmetric, facial movements symmetric, hearing not formally tested but intact to conversation, palate elevation symmetric and uvula midline, no dysarthria, shoulder shrug strong bilaterally, tongue protrusion midline  Motor:  normal muscle tone and bulk, no abnormal movements, able to move all limbs spontaneously, strength 5/5 throughout upper and lower extremities, no pronator drift  Reflexes:  toes down-going, patellar and achilles reflexes 2+ and symmetric  Sensory:  light touch sensation intact and symmetric throughout upper and lower extremities, no extinction on double simultaneous stimulation   Coordination:  normal finger-to-nose. Tremor present.  Station/Gait:  deferred    Dysphagia Screen  Per Nursing, Passed screening, no dysarthria - Regular Diet with thin liquids  2/3/20 2300    Stroke Scales    NIHSS  Interval baseline (02/03/20 2223)   Interval Comments     1a. Level of Consciousness 0-->Alert, keenly responsive   1b. LOC Questions 0-->Answers both questions correctly   1c. LOC Commands 0-->Performs both tasks correctly   2.   Best Gaze 0-->Normal   3.   Visual 0-->No visual loss   4.   Facial Palsy 0-->Normal symmetrical movements   5a. Motor Arm, Left 0-->No drift, limb holds 90 (or 45) degrees for full 10 secs   5b. Motor Arm, Right 0-->No drift, limb holds 90 (or 45) degrees for full 10 secs   6a. Motor  Leg, Left 0-->No drift, leg holds 30 degree position for full 5 secs   6b. Motor Leg, right 0-->No drift, leg holds 30 degree position for full 5 secs   7.   Limb Ataxia 0-->Absent   8.   Sensory 0-->Normal, no sensory loss   9.   Best Language 0-->No aphasia, normal   10. Dysarthria 0-->Normal   11. Extinction and Inattention  0-->No abnormality   Total 0 (02/03/20 2223)       Imaging    Echocardiogram complete w/ Bubble Study  02/04/2020    Interpretation Summary     Left ventricular size, global systolic function, and wall motion are normal,  estimated LVEF 55-60%.  Right ventricular global function is normal.  No significant valvular abnormalities.  Intact atrial septum. A contrast injection (Bubble Study) was performed that  was negative for flow across the interatrial septum. A Valsalva maneuver was  performed.  The ascending aorta is Mildly dilated. Max diameter of the visualized portion  3.9 cm.    MR BRAIN W/O and W CONTRAST  2/4/2020 3:20 AM     INDICATION: Right arm numbness radiating to right leg. Difficulty speaking.  TECHNIQUE: Multiplanar T1- and T2-weighted images were obtained through the brain pre and post administration of contrast.  CONTRAST: 15 mL Gadavist IV.  COMPARISON: CTA [facility] [facility] 2/3/2020.      FINDINGS:   INTRACRANIAL CONTENTS: There are no areas of abnormally restricted diffusion to suggest acute or subacute infarct. There are no areas of abnormal parenchymal susceptibility. There are no areas of abnormal enhancement.     Mild to moderate prominence of the lateral and third ventricles. Mild prominence of the sulci. FLAIR hyperintensity seen within the periventricular white matter with scattered additional foci seen within the deep white matter of both cerebral   hemispheres. No mass effect or midline shift. Cerebellar tonsils are normally positioned. Visualized upper cervical spinal cord, sella, and corpus callosum are unremarkable. Major skull base vascular flow-voids are  preserved.     OSSEOUS STRUCTURES/SOFT TISSUES: Visualized marrow space is unremarkable.      ORBITS: Orbits are normal in appearance.      SINUSES/MASTOIDS: Mild paranasal sinus mucosal thickening. No air-fluid levels. Middle ear cavities and mastoid air cells are clear.                                                                      IMPRESSION:  1. No finding for intracranial hemorrhage, mass, or acute infarct.  2. At least mild atrophy and presumed sequela chronic microvascular ischemic change.    CT ANGIOGRAM OF THE HEAD AND NECK WITHOUT AND WITH CONTRAST    2/3/2020 8:09 PM      COMPARISON: None.     HISTORY: TIA, initial exam.     TECHNIQUE:  Precontrast localizing scans were followed by CT  angiography with an injection of 70 mL Isovue-370 nonionic intravenous  contrast material with scans through the head and neck.  Images were  transferred to a separate 3-D workstation where multiplanar  reformations and 3-D images were created.  Estimates of carotid  stenoses are made relative to the distal internal carotid artery  diameters except as noted.       FINDINGS:   Neck CTA: The common carotid arteries bilaterally are patent and  unremarkable. There is calcified atherosclerotic plaque at the origins  of the internal carotid arteries on both sides that does not result in  any vascular narrowing on either side. The cervical internal carotid  arteries bilaterally are tortuous but are otherwise patent and  unremarkable. There is moderate-severe atherosclerotic narrowing at  the origin of the right vertebral artery. The vertebral arteries  bilaterally are otherwise patent and unremarkable.     Head CTA: There is calcified atherosclerotic plaque of the  intracranial distal internal carotid arteries on both sides that does  not result in any significant vascular narrowing on either side. The  basilar, bilateral anterior cerebral, bilateral middle cerebral and  bilateral posterior cerebral arteries are patent and  unremarkable. The  A1 segment of the right anterior cerebral artery is not visualized and  is likely hypoplastic or congenitally absent. The A2 segment of right  anterior cerebral artery is supplied by the patent anterior  communicating artery. The P1 segment of the right posterior cerebral  artery is not visualized and is likely hypoplastic or congenitally  absent. The right posterior cerebral artery is supplied by the patent  right posterior communicating artery.                                                                      IMPRESSION: Calcified atherosclerotic plaque of the proximal and  distal internal carotid arteries on both sides that does not result in  any vascular narrowing. Moderate-severe atherosclerotic narrowing at  the origin of the right vertebral artery. Otherwise, normal neck and  head CT angiogram.     SHAN RODNEY MD    CT OF THE HEAD WITHOUT CONTRAST   2/3/2020 8:08 PM      COMPARISON: None.     HISTORY: Transient ischemic attack. Right-sided symptoms.     TECHNIQUE: 5 mm thick axial CT images of the head were acquired  without IV contrast material.     FINDINGS: There is mild diffuse cerebral volume loss. There are subtle  patchy areas of decreased density in the cerebral white matter  bilaterally that are consistent with sequela of chronic small vessel  ischemic disease.     The ventricles and basal cisterns are within normal limits in  configuration given the degree of cerebral volume loss.  There is no  midline shift. There are no extra-axial fluid collections.     No intracranial hemorrhage, mass or recent infarct.     The visualized paranasal sinuses are well-aerated. There is no  mastoiditis. There are no fractures of the visualized bones.                                                                      IMPRESSION: Diffuse cerebral volume loss and cerebral white matter  changes consistent with chronic small vessel ischemic disease. No  evidence for acute intracranial  pathology.     SHAN RODNEY MD    Labs Data   CBC  Recent Labs   Lab 02/03/20 1913   WBC 9.2   RBC 4.65   HGB 13.9   HCT 43.1        Basic Metabolic Panel   Recent Labs   Lab 02/04/20  0607 02/03/20 1913    142   POTASSIUM 3.7 3.5   CHLORIDE 107 108   CO2 32 31   BUN 12 15   CR 0.87 0.95   * 126*   SRAVAN 8.8 8.9     Liver Panel  Recent Labs   Lab Test 02/04/20  0607 02/03/20 1913 03/26/13  1410   PROTTOTAL 6.4* 6.7* 7.8   ALBUMIN 3.0* 3.2* 4.2   BILITOTAL 0.7 0.5 0.7   ALKPHOS 103 111 145   AST 18 13 30   ALT 20 19 34     INRNo lab results found.   Lipid Profile  Recent Labs   Lab Test 02/03/20 1913   CHOL 151   HDL 57   LDL 72   TRIG 109     A1C  Recent Labs   Lab Test 02/03/20 1913 03/26/13  1410   A1C 6.6* 9.9*     Troponin I  Recent Labs   Lab 02/04/20  0607 02/03/20 1913   TROPI <0.015 <0.015          Stroke Code / Stroke Consult Data Data This was a non-emergent, non-tele stroke consult.

## 2020-02-04 NOTE — ED NOTES
Bed: ST  Expected date: 2/3/20  Expected time: 6:52 PM  Means of arrival: Ambulance  Comments:  413 69m TIA, irregular pulse  ETA 1900

## 2020-02-04 NOTE — PLAN OF CARE
Discharge Planner SLP   Netta Donna is a 69-year-old male with hypertension, hyperlipidemia, diabetes mellitus type 2, morbid obesity, who was brought to the emergency room by EMS for evaluation of numbness of right upper and lower extremity and lip and difficulty with his speech.  Patient plan for discharge: Home  Current status: Met with patient at bedside. He recently passed the bedside swallow screen and reported no problems taking his pills. His speech and language function are back to baseline. MRI was negative for an acute stroke. No skilled intervention is indicated. Will complete the orders.   Barriers to return to prior living situation: None per speech perspective.   Recommendations for discharge: Per medical team.   Rationale for recommendations: No ST needs.        Entered by: Liudmila Shea 02/04/2020 11:56 AM

## 2020-02-04 NOTE — PLAN OF CARE
Observation goals PRIOR TO DISCHARGE     Comments: -diagnostic tests and consults completed and resulted   Not Met  -vital signs normal or at patient baseline   Not Met  -tolerating oral intake to maintain hydration   Met  -returns to baseline functional status   Not Met  -safe disposition plan has been identified   Met  Nurse to notify provider when observation goals have been met and patient is ready for discharge.  Yes

## 2020-02-04 NOTE — PHARMACY-ADMISSION MEDICATION HISTORY
Pharmacy Medication History  Admission medication history interview status for the 2/3/2020  admission is complete. See EPIC admission navigator for prior to admission medications     Medication history sources: Patient and Patient's family/friend ([facility]  Medication history source reliability: Good  Adherence assessment: Good    Significant changes made to the medication list:  Added Propranolol, Losartan, hydrochlorothiazide., Gabapentin.  Removed Atenolol, ASA, NOrco and Zofran        Medication reconciliation completed by provider prior to medication history? No    Time spent in this activity: 15      Prior to Admission medications    Medication Sig Last Dose Taking? Auth Provider   Atorvastatin Calcium (LIPITOR PO) Take 40 mg by mouth daily. 2/3/2020 at Unknown time Yes Reported, Patient   Febuxostat (ULORIC PO) Take 40 mg by mouth daily. 2/3/2020 at Unknown time Yes Reported, Patient   gabapentin (NEURONTIN) 300 MG capsule Take 600 mg by mouth At Bedtime 2/3/2020 at Unknown time Yes Unknown, Entered By History   GLIPIZIDE XL PO Take 20 mg by mouth daily  2/3/2020 at Unknown time Yes Unknown, Entered By History   hydrochlorothiazide (HYDRODIURIL) 12.5 MG tablet Take 12.5 mg by mouth daily 2/3/2020 at Unknown time Yes Unknown, Entered By History   losartan (COZAAR) 50 MG tablet Take 50 mg by mouth daily 2/3/2020 at Unknown time Yes Unknown, Entered By History   propranolol ER (INDERAL LA) 160 MG 24 hr capsule Take 160 mg by mouth daily 2/3/2020 at Unknown time Yes Unknown, Entered By History

## 2020-02-04 NOTE — PLAN OF CARE
-vital signs normal or at patient baseline- Pt is unaware of baseline. He is hypertensive   -tolerating oral intake to maintain hydration- Goal met   -returns to baseline functional status- Partially met   -safe disposition plan has been identified- Partially met

## 2020-02-04 NOTE — PLAN OF CARE
Discharge Planner OT   Patient plan for discharge: Unknown  Current status:  Orders received and chart reviewed. Patient is a 68 y/o male admitted with R UE and LE numbness and slurred speech. Pt lives with spouse. Per chart, pt is up SBA, symptoms have resolved/at baseline. Pt. has not skilled IP OT needs. Will complete order, sign-off.  Barriers to return to prior living situation: None indicated per chart;defer to medical team  Recommendations for discharge: Defer to medical team  Rationale for recommendations: Pt. is at baseline, symptoms resolved, no skilled OT needs. Will complete order, sign-off.       Entered by: Shikha Ibarra 02/04/2020 12:05 PM

## 2020-02-04 NOTE — H&P
Admitted:     02/03/2020      CHIEF COMPLAINT:  Right upper and lower extremity numbness and slurred speech.      HISTORY OF PRESENT ILLNESS:  Netta Thompson is a 69-year-old pleasant male with morbid obesity, BMI 44, diabetes mellitus type 2, hypertension, hyperlipidemia, who was brought to the ER by EMS for evaluation of right-sided numbness.  I discussed with the patient and his wife as well as daughter present in the room, reviewed his medical record, and also discussed with the ED physician, Dr. Oreilly, for further information.      According to the patient, he was at the desk using his computer. At some point, he had the computer mouse on his right hand but he felt his palm went completely numb.  The numbness gradually progressed to his forearm, and then arm, and he also felt it over his lower lip and right leg.  He thought his speech was also slurred.  He then called the nursing line in the clinic, who suggested him to call 911, and hence he called EMS.  The patient reports the symptoms lasted approximately 30 minutes.  He had very minimal symptoms when the EMS arrived.  He was then transported to ER for further evaluation.      The patient has essential tremor, and when he was put on telemetry, EMS felt he had atrial fibrillation, but I am not able to view a rhythm strip that shows atrial fibrillation.  He does have telemetry strip and that shows sinus with frequent PVCs.      The patient reports on-and-off mild headache behind the right orbit on and off for 2 weeks, for which he is taking Aleve.  He also has been feeling lightheaded, experienced it a couple of times during the last week when he was trying to stand up.  Wife reports that he has been somewhat slow in his thinking and not his usual self.  The patient denies any fever, cough, shortness of breath, chest pain, palpitation, orthopnea or PND.  His wife reported some leg edema, but he reports that he spends a lot of time sitting up in the chair or  recliner.  Denies calf pain.      In the ER, the patient was evaluated by Dr. Oreilly.  The patient was hypertensive with systolic as high as 190s, heart rate and 75, afebrile, respiratory rate 16 and pulse oximetry of 94% on room air.  Lab workup including CBC, CMP, was mostly unremarkable except low magnesium.  A 12-lead EKG shows sinus rhythm with no ischemic changes.  CT head without contrast was negative for acute intracranial process.  CTA head and neck showed atherosclerotic plaque of the proximal and distal internal carotid arteries on both sides that do not result in any significant vascular narrowing.  Moderate to severe atherosclerotic narrowing at the origin of the right vertebral artery was noted.  The patient was requested observation for further evaluation.     REVIEW OF SYSTEMS: All 10 points systems reviewed, negative other than mentioned in HPI.     PAST MEDICAL HISTORY:   1.  Diabetes mellitus type 2.   2.  Hypertension.   3.  Hyperlipidemia.   4.  Essential tremor.   5.  Chronic gout.      PAST SURGICAL HISTORY:     1.  Laparoscopy and lysis of adhesions.   2.  Umbilical hernia repair.      FAMILY HISTORY:   1.  Diabetes, hypertension and coronary artery disease in his father.  Father  of heart attack.   2.  Mom had unknown cancer.        SOCIAL HISTORY:  Former smoker, quit more than 40 years ago.  Drinks alcohol twice weekly, 3 drinks at a time.  He used to work for [employer] and is retired.  Lives with his wife.  He was accompanied by his wife and daughter to the ER.      ALLERGIES:  METFORMIN AND CORTISONE.      HOME MEDICATIONS: Medication reconciliation is pending, but it appears that he takes:   1.  Lipitor 40 mg p.o. daily.   2.  Glipizide 10 mg 2 tabs by mouth daily.   3.  Uloric 40 mg by mouth daily.   4.  Neurontin 300 mg capsule 2 caps by mouth daily at bedtime.   5.  Inderal 80 mg capsules, 2 caps by mouth daily.   6.  Losartan 50 mg by mouth daily.   7.  Hydrochlorothiazide  25 mg tablets, half tab by mouth daily.      PHYSICAL EXAMINATION:   GENERAL:  The patient is alert, awake, oriented, appears comfortable.   VITAL SIGNS:  Blood pressure 194/83, heart rate 75, temperature 96.3, respirations 16, pulse oximetry 94 on room air.   HEENT:  PERRLA, EOMI.  Oral mucosa moist.   NECK:  Supple.   LUNGS:  Bilateral equal air entry, clear to auscultation.  Normal work of breathing on room air.   CARDIOVASCULAR:  S1, S2, regular, no murmur, rub, gallop, no tachycardia.   ABDOMEN:  Soft, obese, slightly distended, nontender.  Bowel sounds, active.   MUSCULOSKELETAL:  No deformity.  The patient has bilateral 2+ pitting pedal edema, no calf tenderness.   SKIN:  No rash.   NEUROLOGIC:  Alert, oriented x 3.  Cranial nerves II-XII intact.  Tremor noted more on his right upper extremity than left.  Otherwise, cranial nerves II-XII intact.  Strength is symmetrical.      LABORATORY DATA:  As above.  CBC normal.  Blood sugar 126.  CMP unremarkable except mildly low albumin of 3.2.  Magnesium 1.5.  Serum alcohol level less than 0.01.  CT head without contrast and CTA head and neck as above, 12-lead EKG as above.      ASSESSMENT AND PLAN:  Netta Thompson is a 69-year-old male with hypertension, hyperlipidemia, diabetes mellitus type 2, morbid obesity, who was brought to the emergency room by EMS for evaluation of numbness of right upper and lower extremity and lip and difficulty with his speech.      1.  Suspected transient ischemic attack:  The patient with symptoms as above, has risk factors including diabetes mellitus type 2, hypertension, hyperlipidemia.  Symptoms started around 6:30 p.m. this evening had completely subsided by the time he was in the emergency room.  Initial workup with above imaging studies is negative, except some nonsignificant atherosclerotic narrowing at the origin of the right vertebral artery.  Bailey to observation.   -- Telemetry.   -- N.p.o., bedside swallow evaluation.   -- A  2D echocardiogram with bubble study.   --Serial neuro checks.     -- Full low-dose aspirin.     -- Neurology consult.   -- Check hemoglobin A1c, fasting lipid panel.   -- PTA is on atorvastatin 40 mg p.o. daily continued.   -- PT, OT and Speech and  consult.   -- Gentle IV hydration overnight.   2.  Bilateral leg edema, new per patient and his wife.  He reports he spends a lot of time sitting in the chair or recliner.  No calf tenderness noted.  We will check proBNP, and also vascular ultrasound of the lower extremities.  He is on hydrochlorothiazide PTA, which will be held given his possible TIA.  Follow daily weight, intake and output.  Based on his echocardiogram, likely needs to be on diuresis.   3.  Diabetes mellitus type 2:  The patient used to be on insulin in the past but currently is on Glipizide only.  Medication reconciliation is pending.  I will put him on medium insulin resistance sliding scale for now.  Check HbA1c.   4.  Hypertension:  PTA is on hydrochlorothiazide, losartan for hypertension and also propranolol for essential tremor.  Above medications will be held to allow permissive hypertension in the setting of TIA.  PRN hydralazine and labetalol has been ordered.   5.  Gout:  Resume Uloric when medication reconciliation completed.   6.  Deep venous thrombosis prophylaxis:  Anticipate short stay, encourage ambulation.   7.  CODE STATUS:  FULL CODE.  This was discussed with the patient and his family in the room.     8.  Morbid obesity:  Management per primary care provider.   9.  Essential tremor:  Resume Inderal when medication reconciliation completed.      Anticipate 24 hours with Observation orders entered.  Care plan discussed with the patient and family.         TAMMIE BUSH MD             D: 2020   T: 2020   MT: EMILIANO      Name:     KRIS ALVES   MRN:      [id number]        Account:      [id number]   :      1950        Admitted:     2020                    Document: [id number]       cc: [facility]